# Patient Record
Sex: FEMALE | Race: WHITE | NOT HISPANIC OR LATINO
[De-identification: names, ages, dates, MRNs, and addresses within clinical notes are randomized per-mention and may not be internally consistent; named-entity substitution may affect disease eponyms.]

---

## 2022-05-09 ENCOUNTER — APPOINTMENT (OUTPATIENT)
Dept: ORTHOPEDIC SURGERY | Facility: CLINIC | Age: 52
End: 2022-05-09
Payer: COMMERCIAL

## 2022-05-09 VITALS — WEIGHT: 190 LBS | BODY MASS INDEX: 31.65 KG/M2 | HEIGHT: 65 IN

## 2022-05-09 DIAGNOSIS — S52.125A NONDISPLACED FRACTURE OF HEAD OF LEFT RADIUS, INITIAL ENCOUNTER FOR CLOSED FRACTURE: ICD-10-CM

## 2022-05-09 PROCEDURE — 99204 OFFICE O/P NEW MOD 45 MIN: CPT | Mod: 57

## 2022-05-09 NOTE — DISCUSSION/SUMMARY
[de-identified] : The documentation recorded by the scribe accurately reflects the service I personally performed and the decisions made by me.\par I, Amado Hutton, attest that this documentation has been prepared under the direction and in the presence of Provider Asim Paige MD.\par The patient was seen by Dr. Paige\par

## 2022-05-09 NOTE — ASSESSMENT
[FreeTextEntry1] : Underlying pathology and treatment options discussed. \par Fracture protocol discussed \par Outside xray of the left elbow reveals radial head fracture\par conservative care reviewed. \par NSAIDS use recommended. Has 800 mg script of Motrin. \par Questions addressed.\par \par Due to location referred to Dr. Ambrose in [facility]

## 2022-05-09 NOTE — HISTORY OF PRESENT ILLNESS
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

## 2022-05-09 NOTE — REASON FOR VISIT
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 [facility] Abrashion to posterior elbow

## 2022-05-09 NOTE — REVIEW OF SYSTEMS
[Joint Pain] : joint pain [Joint Swelling] : joint swelling [Muscle Weakness] : muscle weakness [Negative] : Heme/Lymph

## 2022-05-09 NOTE — RETURN TO WORK/SCHOOL
[Return Date: _____] : as of [unfilled].  This has been discussed in detail with ~Radha~ and ~he/she~ understands this. 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

## 2022-05-09 NOTE — PHYSICAL EXAM
[Orientated] : orientated [Able to Communicate] : able to communicate [Normal Skin] : normal skin [Left] : left elbow [] : swelling [FreeTextEntry3] : Posterior elbow abrasion [FreeTextEntry9] : % degress to extension.  [TWNoteComboBox7] : flexion 130 degrees

## 2022-05-31 ENCOUNTER — APPOINTMENT (OUTPATIENT)
Dept: ORTHOPEDIC SURGERY | Facility: CLINIC | Age: 52
End: 2022-05-31
Payer: COMMERCIAL

## 2022-05-31 VITALS — WEIGHT: 190 LBS | BODY MASS INDEX: 31.65 KG/M2 | HEIGHT: 65 IN

## 2022-05-31 DIAGNOSIS — S52.125D NONDISPLACED FRACTURE OF HEAD OF LEFT RADIUS, SUBSEQUENT ENCOUNTER FOR CLOSED FRACTURE WITH ROUTINE HEALING: ICD-10-CM

## 2022-05-31 PROCEDURE — 99214 OFFICE O/P EST MOD 30 MIN: CPT

## 2022-05-31 PROCEDURE — 73080 X-RAY EXAM OF ELBOW: CPT | Mod: LT

## 2022-05-31 NOTE — ASSESSMENT
[FreeTextEntry1] : The condition was explained to the patient. X-rays show acceptable alignment and interval healing. Discussed risk of elbow stiffness and loss of motion with this injury.\par - continue HEP.\par - activity as tolerated.\par \par F/u PRN.

## 2022-05-31 NOTE — IMAGING
[de-identified] : LEFT ELBOW\par skin intact. no swelling.\par no TTP. \par elbow ROM: extension -5, flexion 130. mild limited pronation, good supination. \par good wrist extension, flexion.\par good digital flexion and extension.\par Sensation intact to light touch.\par palpable radial pulse. [Left] : left elbow [FreeTextEntry1] : stable position/alignment of radial head fx, interval healing.

## 2022-05-31 NOTE — HISTORY OF PRESENT ILLNESS
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 Sustained a LEFT radial head fx from a fall on 5/6/22.\par [facility]par Saw Dr. Paige 5/9/22 => referred here.\par Pain has nearly resolved, still hurts with rotation. Has d/c'ed sling.\par \par Hx: high cholesterol. [] : no [FreeTextEntry1] : left elbow [FreeTextEntry7] : left forearm and wrist [FreeTextEntry9] : sling [de-identified] : twisting the hand to the left [de-identified] : Dr Paige

## 2024-10-14 ENCOUNTER — NON-APPOINTMENT (OUTPATIENT)
Age: 54
End: 2024-10-14

## 2024-11-18 ENCOUNTER — APPOINTMENT (OUTPATIENT)
Dept: ORTHOPEDIC SURGERY | Facility: CLINIC | Age: 54
End: 2024-11-18
Payer: COMMERCIAL

## 2024-11-18 PROCEDURE — 99213 OFFICE O/P EST LOW 20 MIN: CPT

## 2024-11-18 PROCEDURE — 73080 X-RAY EXAM OF ELBOW: CPT | Mod: RT

## 2024-11-20 RX ORDER — MELOXICAM 15 MG/1
15 TABLET ORAL
Qty: 30 | Refills: 0 | Status: ACTIVE | COMMUNITY
Start: 2024-11-20 | End: 1900-01-01

## 2024-11-25 ENCOUNTER — APPOINTMENT (OUTPATIENT)
Dept: ORTHOPEDIC SURGERY | Facility: CLINIC | Age: 54
End: 2024-11-25
Payer: COMMERCIAL

## 2024-11-25 DIAGNOSIS — S42.401A UNSPECIFIED FRACTURE OF LOWER END OF RIGHT HUMERUS, INITIAL ENCOUNTER FOR CLOSED FRACTURE: ICD-10-CM

## 2024-11-25 DIAGNOSIS — S50.01XA CONTUSION OF RIGHT ELBOW, INITIAL ENCOUNTER: ICD-10-CM

## 2024-11-25 DIAGNOSIS — S53.401A UNSPECIFIED SPRAIN OF RIGHT ELBOW, INITIAL ENCOUNTER: ICD-10-CM

## 2024-11-25 PROCEDURE — 99214 OFFICE O/P EST MOD 30 MIN: CPT

## 2024-11-27 PROBLEM — S50.01XA CONTUSION OF RIGHT ELBOW, INITIAL ENCOUNTER: Status: ACTIVE | Noted: 2024-11-27

## 2024-11-27 PROBLEM — S42.401A ELBOW FRACTURE, RIGHT: Noted: 2024-11-18

## 2024-11-27 PROBLEM — S53.401A SPRAIN OF ELBOW, RIGHT: Status: ACTIVE | Noted: 2024-11-27

## 2024-12-23 ENCOUNTER — APPOINTMENT (OUTPATIENT)
Dept: ORTHOPEDIC SURGERY | Facility: CLINIC | Age: 54
End: 2024-12-23
Payer: COMMERCIAL

## 2024-12-23 DIAGNOSIS — S50.01XA CONTUSION OF RIGHT ELBOW, INITIAL ENCOUNTER: ICD-10-CM

## 2024-12-23 DIAGNOSIS — M77.11 LATERAL EPICONDYLITIS, RIGHT ELBOW: ICD-10-CM

## 2024-12-23 PROCEDURE — 99213 OFFICE O/P EST LOW 20 MIN: CPT
